# Patient Record
Sex: FEMALE | URBAN - METROPOLITAN AREA
[De-identification: names, ages, dates, MRNs, and addresses within clinical notes are randomized per-mention and may not be internally consistent; named-entity substitution may affect disease eponyms.]

---

## 2023-03-29 ENCOUNTER — APPOINTMENT (OUTPATIENT)
Dept: URBAN - METROPOLITAN AREA CLINIC 186 | Age: 53
Setting detail: DERMATOLOGY
End: 2023-03-29

## 2023-03-29 DIAGNOSIS — Z41.9 ENCOUNTER FOR PROCEDURE FOR PURPOSES OTHER THAN REMEDYING HEALTH STATE, UNSPECIFIED: ICD-10-CM

## 2023-03-29 PROCEDURE — OTHER LASH LIFT AND TINT: OTHER

## 2023-03-29 PROCEDURE — OTHER INVENTORY: OTHER

## 2023-03-29 NOTE — PROCEDURE: LASH LIFT AND TINT
Procedure Text: Lashes cleansed with soap n water. Shield glued on the lid. Eyelashes glued to the shield. Solution 1 was applied and left on for 11 mins. Wiped it off. Set solution was applied for 11 mins. Wiped it off. Applied the tint and left it on for 6-7 mins. Cleansed with moisturizing solution. She was happy with the lift. No irritation or redness experienced.
Price (Use Numbers Only, No Special Characters Or $): 90
Intro And Consent: Generally, the results are excellent. However, a perfect result is not a realistic expectation during the first appointment. The procedure was thoroughly explained and the patient expressed their verbal understanding.
Detail Level: Zone